# Patient Record
Sex: FEMALE | Race: WHITE | URBAN - METROPOLITAN AREA
[De-identification: names, ages, dates, MRNs, and addresses within clinical notes are randomized per-mention and may not be internally consistent; named-entity substitution may affect disease eponyms.]

---

## 2017-09-12 ENCOUNTER — OUTPATIENT (OUTPATIENT)
Dept: OUTPATIENT SERVICES | Facility: HOSPITAL | Age: 72
LOS: 1 days | Discharge: HOME | End: 2017-09-12

## 2017-09-12 DIAGNOSIS — Z12.31 ENCOUNTER FOR SCREENING MAMMOGRAM FOR MALIGNANT NEOPLASM OF BREAST: ICD-10-CM

## 2018-09-14 ENCOUNTER — OUTPATIENT (OUTPATIENT)
Dept: OUTPATIENT SERVICES | Facility: HOSPITAL | Age: 73
LOS: 1 days | Discharge: HOME | End: 2018-09-14

## 2018-09-14 DIAGNOSIS — Z12.31 ENCOUNTER FOR SCREENING MAMMOGRAM FOR MALIGNANT NEOPLASM OF BREAST: ICD-10-CM

## 2019-10-28 ENCOUNTER — OUTPATIENT (OUTPATIENT)
Dept: OUTPATIENT SERVICES | Facility: HOSPITAL | Age: 74
LOS: 1 days | Discharge: HOME | End: 2019-10-28
Payer: MEDICARE

## 2019-10-28 DIAGNOSIS — Z12.31 ENCOUNTER FOR SCREENING MAMMOGRAM FOR MALIGNANT NEOPLASM OF BREAST: ICD-10-CM

## 2019-10-28 PROCEDURE — 77063 BREAST TOMOSYNTHESIS BI: CPT | Mod: 26

## 2019-10-28 PROCEDURE — 77067 SCR MAMMO BI INCL CAD: CPT | Mod: 26

## 2019-12-10 ENCOUNTER — OUTPATIENT (OUTPATIENT)
Dept: OUTPATIENT SERVICES | Facility: HOSPITAL | Age: 74
LOS: 1 days | Discharge: HOME | End: 2019-12-10
Payer: MEDICARE

## 2019-12-10 DIAGNOSIS — R92.8 OTHER ABNORMAL AND INCONCLUSIVE FINDINGS ON DIAGNOSTIC IMAGING OF BREAST: ICD-10-CM

## 2019-12-10 PROCEDURE — 77065 DX MAMMO INCL CAD UNI: CPT | Mod: 26,RT

## 2019-12-10 PROCEDURE — 76642 ULTRASOUND BREAST LIMITED: CPT | Mod: 26,RT

## 2019-12-10 PROCEDURE — G0279: CPT | Mod: 26,RT

## 2020-03-28 ENCOUNTER — INPATIENT (INPATIENT)
Facility: HOSPITAL | Age: 75
LOS: 1 days | Discharge: HOME HEALTH | End: 2020-03-30
Attending: INTERNAL MEDICINE | Admitting: INTERNAL MEDICINE
Payer: MEDICARE

## 2020-03-28 VITALS
WEIGHT: 139.99 LBS | HEART RATE: 107 BPM | TEMPERATURE: 102 F | SYSTOLIC BLOOD PRESSURE: 144 MMHG | DIASTOLIC BLOOD PRESSURE: 67 MMHG | OXYGEN SATURATION: 93 % | HEIGHT: 61 IN | RESPIRATION RATE: 20 BRPM

## 2020-03-28 LAB
ALBUMIN SERPL ELPH-MCNC: 4.4 G/DL — SIGNIFICANT CHANGE UP (ref 3.5–5.2)
ALP SERPL-CCNC: 77 U/L — SIGNIFICANT CHANGE UP (ref 30–115)
ALT FLD-CCNC: 15 U/L — SIGNIFICANT CHANGE UP (ref 0–41)
ANION GAP SERPL CALC-SCNC: 14 MMOL/L — SIGNIFICANT CHANGE UP (ref 7–14)
APTT BLD: 29.3 SEC — SIGNIFICANT CHANGE UP (ref 27–39.2)
AST SERPL-CCNC: 24 U/L — SIGNIFICANT CHANGE UP (ref 0–41)
BASE EXCESS BLDV CALC-SCNC: 1.8 MMOL/L — SIGNIFICANT CHANGE UP (ref -2–2)
BASOPHILS # BLD AUTO: 0.01 K/UL — SIGNIFICANT CHANGE UP (ref 0–0.2)
BASOPHILS NFR BLD AUTO: 0.2 % — SIGNIFICANT CHANGE UP (ref 0–1)
BILIRUB SERPL-MCNC: 0.5 MG/DL — SIGNIFICANT CHANGE UP (ref 0.2–1.2)
BUN SERPL-MCNC: 23 MG/DL — HIGH (ref 10–20)
CA-I SERPL-SCNC: 1.18 MMOL/L — SIGNIFICANT CHANGE UP (ref 1.12–1.3)
CALCIUM SERPL-MCNC: 9.1 MG/DL — SIGNIFICANT CHANGE UP (ref 8.5–10.1)
CHLORIDE SERPL-SCNC: 101 MMOL/L — SIGNIFICANT CHANGE UP (ref 98–110)
CO2 SERPL-SCNC: 25 MMOL/L — SIGNIFICANT CHANGE UP (ref 17–32)
CREAT SERPL-MCNC: 0.8 MG/DL — SIGNIFICANT CHANGE UP (ref 0.7–1.5)
EOSINOPHIL # BLD AUTO: 0 K/UL — SIGNIFICANT CHANGE UP (ref 0–0.7)
EOSINOPHIL NFR BLD AUTO: 0 % — SIGNIFICANT CHANGE UP (ref 0–8)
GAS PNL BLDV: 142 MMOL/L — SIGNIFICANT CHANGE UP (ref 136–145)
GAS PNL BLDV: SIGNIFICANT CHANGE UP
GLUCOSE SERPL-MCNC: 134 MG/DL — HIGH (ref 70–99)
HCO3 BLDV-SCNC: 27 MMOL/L — SIGNIFICANT CHANGE UP (ref 22–29)
HCT VFR BLD CALC: 43.5 % — SIGNIFICANT CHANGE UP (ref 37–47)
HCT VFR BLDA CALC: 49.3 % — HIGH (ref 34–44)
HGB BLD CALC-MCNC: 16.1 G/DL — SIGNIFICANT CHANGE UP (ref 14–18)
HGB BLD-MCNC: 13.9 G/DL — SIGNIFICANT CHANGE UP (ref 12–16)
HOROWITZ INDEX BLDV+IHG-RTO: 21 — SIGNIFICANT CHANGE UP
IMM GRANULOCYTES NFR BLD AUTO: 0.3 % — SIGNIFICANT CHANGE UP (ref 0.1–0.3)
INR BLD: 1.14 RATIO — SIGNIFICANT CHANGE UP (ref 0.65–1.3)
LACTATE BLDV-MCNC: 1.4 MMOL/L — SIGNIFICANT CHANGE UP (ref 0.5–1.6)
LYMPHOCYTES # BLD AUTO: 1.04 K/UL — LOW (ref 1.2–3.4)
LYMPHOCYTES # BLD AUTO: 16.4 % — LOW (ref 20.5–51.1)
MCHC RBC-ENTMCNC: 24.7 PG — LOW (ref 27–31)
MCHC RBC-ENTMCNC: 32 G/DL — SIGNIFICANT CHANGE UP (ref 32–37)
MCV RBC AUTO: 77.3 FL — LOW (ref 81–99)
MONOCYTES # BLD AUTO: 0.41 K/UL — SIGNIFICANT CHANGE UP (ref 0.1–0.6)
MONOCYTES NFR BLD AUTO: 6.5 % — SIGNIFICANT CHANGE UP (ref 1.7–9.3)
NEUTROPHILS # BLD AUTO: 4.87 K/UL — SIGNIFICANT CHANGE UP (ref 1.4–6.5)
NEUTROPHILS NFR BLD AUTO: 76.6 % — HIGH (ref 42.2–75.2)
NRBC # BLD: 0 /100 WBCS — SIGNIFICANT CHANGE UP (ref 0–0)
NT-PROBNP SERPL-SCNC: 56 PG/ML — SIGNIFICANT CHANGE UP (ref 0–300)
PCO2 BLDV: 45 MMHG — SIGNIFICANT CHANGE UP (ref 41–51)
PH BLDV: 7.4 — SIGNIFICANT CHANGE UP (ref 7.26–7.43)
PLATELET # BLD AUTO: 125 K/UL — LOW (ref 130–400)
PO2 BLDV: 24 MMHG — SIGNIFICANT CHANGE UP (ref 20–40)
POTASSIUM BLDV-SCNC: 3.6 MMOL/L — SIGNIFICANT CHANGE UP (ref 3.3–5.6)
POTASSIUM SERPL-MCNC: 3.8 MMOL/L — SIGNIFICANT CHANGE UP (ref 3.5–5)
POTASSIUM SERPL-SCNC: 3.8 MMOL/L — SIGNIFICANT CHANGE UP (ref 3.5–5)
PROT SERPL-MCNC: 7.3 G/DL — SIGNIFICANT CHANGE UP (ref 6–8)
PROTHROM AB SERPL-ACNC: 13.1 SEC — HIGH (ref 9.95–12.87)
RBC # BLD: 5.63 M/UL — HIGH (ref 4.2–5.4)
RBC # FLD: 13.9 % — SIGNIFICANT CHANGE UP (ref 11.5–14.5)
SAO2 % BLDV: 40 % — SIGNIFICANT CHANGE UP
SODIUM SERPL-SCNC: 140 MMOL/L — SIGNIFICANT CHANGE UP (ref 135–146)
TROPONIN T SERPL-MCNC: <0.01 NG/ML — SIGNIFICANT CHANGE UP
WBC # BLD: 6.35 K/UL — SIGNIFICANT CHANGE UP (ref 4.8–10.8)
WBC # FLD AUTO: 6.35 K/UL — SIGNIFICANT CHANGE UP (ref 4.8–10.8)

## 2020-03-28 PROCEDURE — 99497 ADVNCD CARE PLAN 30 MIN: CPT | Mod: 25

## 2020-03-28 PROCEDURE — 99285 EMERGENCY DEPT VISIT HI MDM: CPT

## 2020-03-28 PROCEDURE — 71045 X-RAY EXAM CHEST 1 VIEW: CPT | Mod: 26

## 2020-03-28 PROCEDURE — 99223 1ST HOSP IP/OBS HIGH 75: CPT

## 2020-03-28 RX ORDER — METFORMIN HYDROCHLORIDE 850 MG/1
1 TABLET ORAL
Qty: 0 | Refills: 0 | DISCHARGE

## 2020-03-28 RX ORDER — ALBUTEROL 90 UG/1
2 AEROSOL, METERED ORAL EVERY 6 HOURS
Refills: 0 | Status: DISCONTINUED | OUTPATIENT
Start: 2020-03-28 | End: 2020-03-30

## 2020-03-28 RX ORDER — AZITHROMYCIN 500 MG/1
500 TABLET, FILM COATED ORAL ONCE
Refills: 0 | Status: COMPLETED | OUTPATIENT
Start: 2020-03-28 | End: 2020-03-28

## 2020-03-28 RX ORDER — CHLORHEXIDINE GLUCONATE 213 G/1000ML
1 SOLUTION TOPICAL
Refills: 0 | Status: DISCONTINUED | OUTPATIENT
Start: 2020-03-28 | End: 2020-03-30

## 2020-03-28 RX ORDER — AMLODIPINE BESYLATE 2.5 MG/1
1 TABLET ORAL
Qty: 0 | Refills: 0 | DISCHARGE

## 2020-03-28 RX ORDER — BUDESONIDE AND FORMOTEROL FUMARATE DIHYDRATE 160; 4.5 UG/1; UG/1
2 AEROSOL RESPIRATORY (INHALATION)
Refills: 0 | Status: DISCONTINUED | OUTPATIENT
Start: 2020-03-28 | End: 2020-03-30

## 2020-03-28 RX ORDER — AMLODIPINE BESYLATE 2.5 MG/1
5 TABLET ORAL DAILY
Refills: 0 | Status: DISCONTINUED | OUTPATIENT
Start: 2020-03-28 | End: 2020-03-30

## 2020-03-28 RX ORDER — ACETAMINOPHEN 500 MG
650 TABLET ORAL EVERY 6 HOURS
Refills: 0 | Status: DISCONTINUED | OUTPATIENT
Start: 2020-03-28 | End: 2020-03-30

## 2020-03-28 RX ORDER — ATORVASTATIN CALCIUM 80 MG/1
10 TABLET, FILM COATED ORAL AT BEDTIME
Refills: 0 | Status: DISCONTINUED | OUTPATIENT
Start: 2020-03-28 | End: 2020-03-30

## 2020-03-28 RX ORDER — PIOGLITAZONE HYDROCHLORIDE 15 MG/1
1 TABLET ORAL
Qty: 0 | Refills: 0 | DISCHARGE

## 2020-03-28 RX ORDER — ACETAMINOPHEN 500 MG
975 TABLET ORAL ONCE
Refills: 0 | Status: COMPLETED | OUTPATIENT
Start: 2020-03-28 | End: 2020-03-28

## 2020-03-28 RX ORDER — SODIUM CHLORIDE 9 MG/ML
1000 INJECTION, SOLUTION INTRAVENOUS ONCE
Refills: 0 | Status: COMPLETED | OUTPATIENT
Start: 2020-03-28 | End: 2020-03-28

## 2020-03-28 RX ADMIN — Medication 975 MILLIGRAM(S): at 20:47

## 2020-03-28 RX ADMIN — AZITHROMYCIN 255 MILLIGRAM(S): 500 TABLET, FILM COATED ORAL at 20:49

## 2020-03-28 RX ADMIN — SODIUM CHLORIDE 1000 MILLILITER(S): 9 INJECTION, SOLUTION INTRAVENOUS at 20:48

## 2020-03-28 NOTE — ED PROVIDER NOTE - NS ED ROS FT
Review of Systems         Constitutional: See HPI       EENT:  See HPI       Cardiovascular: (-) chest pain (-) syncope       Respiratory: See HPI       Gastrointestinal: (-) abdominal pain (-) vomiting (-) diarrhea (-) nausea (-) constipation       Genitourinary: (-) dysuria       Musculoskeletal: (-) neck pain (-) back pain (-) joint pain       Integumentary: (-) rash       Neurological: (-) headache (-) altered mental status (-) dizziness       Psych: (-) psych history

## 2020-03-28 NOTE — H&P ADULT - NSHPLABSRESULTS_GEN_ALL_CORE
13.9   6.35  )-----------( 125      ( 28 Mar 2020 21:08 )             43.5       03-28    140  |  101  |  23<H>  ----------------------------<  134<H>  3.8   |  25  |  0.8    Ca    9.1      28 Mar 2020 21:08    TPro  7.3  /  Alb  4.4  /  TBili  0.5  /  DBili  x   /  AST  24  /  ALT  15  /  AlkPhos  77  03-28          PT/INR - ( 28 Mar 2020 21:08 )   PT: 13.10 sec;   INR: 1.14 ratio         PTT - ( 28 Mar 2020 21:08 )  PTT:29.3 sec      CARDIAC MARKERS ( 28 Mar 2020 21:08 )  x     / <0.01 ng/mL / x     / x     / x

## 2020-03-28 NOTE — ED PROVIDER NOTE - OBJECTIVE STATEMENT
74 year old female hx COPD, DM, HTN, HLD presenting with flu-like sxs. Patient states she has had fevers, chills, myalgias, dry cough, chest congestion, headache. Her brother passed away 2 days ago from covid and her sister has it; pastora has been in contact with them 2 days before sxs began. Yesterday patient began experiencign shortness of breath at rest, worse with walking, moderate, no pall/prov factors. Patient hasn't been on any abx and is nto on any medications for her copd. No neck pain/rashes. 74 year old female hx COPD, DM, HTN, HLD presenting with flu-like sxs x 6 days. Patient states she has had fevers, chills, myalgias, dry cough, chest congestion, headache. Her brother passed away 2 days ago from covid and her sister has it; patient has been in contact with them 2 days before sxs began. Yesterday patient began experiencing shortness of breath at rest, worse with walking, moderate, no pall/prov factors. Patient hasn't been on any abx and is not on any medications for her copd. No neck pain/rashes/chest pain/abd pain/n/v/d.

## 2020-03-28 NOTE — H&P ADULT - HISTORY OF PRESENT ILLNESS
73 yo F with PMHx of COPD not on home oxygen, active smoker, DM II, HTN, HLD presented with complaint of fevers, chills, myalgias, productive cough of whitish sputum and chest tightness for several days duration. Patient's brother recently  from COVID-19 complications and her sister was also tested positive recently discharged from the hospital. Patient denies nausea, vomiting, chest pain, abdominal pain.

## 2020-03-28 NOTE — H&P ADULT - ASSESSMENT
75 yo F presented with fever, sob, cough.    #Suspected Viral URI, doubt concurrent COPD exacerbation: COVID-19 sent, ID and hydroxychloroquine if positive, incentive spirometry, pulmonary toilet, anti-pyretic, started on Symbicort, proair PRN, f/u CRP, procalcitonin, AM EKG, patient was saturating 93% on room air, 96% on 2L nasal cannula, desaturated to 86% while ambulating in ED.     #DM (monitor fingersticks start basal bolus insulin if greater than 180)/HLD/HTN: Continue home medications     Disposition: Home when medically stable. 75 yo F presented with fever, sob, cough.    #Suspected Viral URI, doubt concurrent COPD exacerbation: COVID-19 sent, ID and hydroxychloroquine if positive, incentive spirometry, pulmonary toilet, anti-pyretic, started on Symbicort, proair PRN, f/u CRP, procalcitonin, AM EKG, patient was saturating 93% on room air, 96% on 2L nasal cannula, desaturated to 86% while ambulating in ED.     #DM (monitor fingersticks start basal bolus insulin if greater than 180)/HLD/HTN: Continue home medications     #Tobacco cessation counseled    Disposition: Home when medically stable.

## 2020-03-28 NOTE — ED PROVIDER NOTE - ATTENDING CONTRIBUTION TO CARE
I personally evaluated the patient. I reviewed the Resident’s or Physician Assistant’s note (as assigned above), and agree with the findings and plan except as documented in my note.  Chart reviewed. H/O COPD, HTN, DM, presents with fever, cough and SOB. States brother passed away from Corona infection. Exam shows alert patient in no distress, HEENT NCAT, lungs with rales right base, tachycardic, abdomen soft NT +BS, no CCE.

## 2020-03-28 NOTE — H&P ADULT - NSHPPHYSICALEXAM_GEN_ALL_CORE
PHYSICAL EXAM:    CONSTITUTIONAL: NAD  ENMT: EOMI, PERRLA, No tonsillar erythema, exudates, or enlargement, neck supple, No JVD  PSYCH: Alert & Oriented X3  RESPIRATORY: Decreased bilateral air entry, negative rales, minimal basilar rales   CARDIOVASCULAR: Regular rate and rhythm; No murmurs, rubs, or gallops, negative edema  GASTROINTESTINAL: Soft, Nontender, Nondistended; Bowel sounds present  EXTREMITIES:  2+ Peripheral Pulses, No clubbing, cyanosis  SKIN: No rashes or lesions

## 2020-03-28 NOTE — ED PROVIDER NOTE - CLINICAL SUMMARY MEDICAL DECISION MAKING FREE TEXT BOX
Labs unremarkable. Covid swab pending. CXR bilateral infiltrates. EKG no acute changes. Patient desaturates on mild exertion. Given IVF and azirthromycin. Will admit.

## 2020-03-28 NOTE — ED PROVIDER NOTE - PROGRESS NOTE DETAILS
Patient 96% on 3L NC.   90% on RA sitting in stretcher  drops to 86% with ambulation.  had a discussion with patient with risks and benefits of going home vs staying in hospital.

## 2020-03-28 NOTE — GOALS OF CARE CONVERSATION - ADVANCED CARE PLANNING - CONVERSATION DETAILS
Goals of care discussed in light of patient's presenting symptoms and comorbidities, patient wishes to be full code, wishes for trial of intubation/mechanical ventilation if respiratory status deteriorates.

## 2020-03-28 NOTE — ED PROVIDER NOTE - CARE PLAN
Principal Discharge DX:	Hypoxia  Secondary Diagnosis:	Viral illness  Secondary Diagnosis:	Dyspnea  Secondary Diagnosis:	Fever  Secondary Diagnosis:	Cough

## 2020-03-28 NOTE — ED PROVIDER NOTE - PHYSICAL EXAMINATION
Physical Exam    Vital Signs: I have reviewed the initial vital signs  Constitutional: well-nourished, appears stated age, no acute distress  EENT: Conjunctiva pink, Sclera clear, PERRLA, EOMI. Mucous membranes dry, no exudates or lesions noted, uvula midline.  Cardiovascular: S1 and S2 present, regular rate, regular rhythm. Well perfused extremities, no peripheral edema  Respiratory: right basilar crackles, 93% on room air, unlabored respiratory effort, otherwise clear to auscultation bilaterally no wheezing, rales or rhonchi  Gastrointestinal: soft, non-tender abdomen. No guarding or rebound tenderness  Musculoskeletal: supple nontender neck, no midline tenderness, no joint pain  Integumentary: warm, dry, no rash  Psychiatric: appropriate mood, appropriate affect

## 2020-03-29 LAB
ANION GAP SERPL CALC-SCNC: 12 MMOL/L — SIGNIFICANT CHANGE UP (ref 7–14)
BASOPHILS # BLD AUTO: 0.02 K/UL — SIGNIFICANT CHANGE UP (ref 0–0.2)
BASOPHILS NFR BLD AUTO: 0.3 % — SIGNIFICANT CHANGE UP (ref 0–1)
BUN SERPL-MCNC: 17 MG/DL — SIGNIFICANT CHANGE UP (ref 10–20)
CALCIUM SERPL-MCNC: 8.4 MG/DL — LOW (ref 8.5–10.1)
CHLORIDE SERPL-SCNC: 105 MMOL/L — SIGNIFICANT CHANGE UP (ref 98–110)
CO2 SERPL-SCNC: 25 MMOL/L — SIGNIFICANT CHANGE UP (ref 17–32)
CREAT SERPL-MCNC: 0.5 MG/DL — LOW (ref 0.7–1.5)
CRP SERPL-MCNC: 4.11 MG/DL — HIGH (ref 0–0.4)
EOSINOPHIL # BLD AUTO: 0 K/UL — SIGNIFICANT CHANGE UP (ref 0–0.7)
EOSINOPHIL NFR BLD AUTO: 0 % — SIGNIFICANT CHANGE UP (ref 0–8)
GLUCOSE BLDC GLUCOMTR-MCNC: 110 MG/DL — HIGH (ref 70–99)
GLUCOSE BLDC GLUCOMTR-MCNC: 114 MG/DL — HIGH (ref 70–99)
GLUCOSE SERPL-MCNC: 120 MG/DL — HIGH (ref 70–99)
HCT VFR BLD CALC: 37.8 % — SIGNIFICANT CHANGE UP (ref 37–47)
HGB BLD-MCNC: 12.1 G/DL — SIGNIFICANT CHANGE UP (ref 12–16)
IMM GRANULOCYTES NFR BLD AUTO: 0.3 % — SIGNIFICANT CHANGE UP (ref 0.1–0.3)
LYMPHOCYTES # BLD AUTO: 1.13 K/UL — LOW (ref 1.2–3.4)
LYMPHOCYTES # BLD AUTO: 17.7 % — LOW (ref 20.5–51.1)
MAGNESIUM SERPL-MCNC: 1.8 MG/DL — SIGNIFICANT CHANGE UP (ref 1.8–2.4)
MCHC RBC-ENTMCNC: 24.7 PG — LOW (ref 27–31)
MCHC RBC-ENTMCNC: 32 G/DL — SIGNIFICANT CHANGE UP (ref 32–37)
MCV RBC AUTO: 77.3 FL — LOW (ref 81–99)
MONOCYTES # BLD AUTO: 0.42 K/UL — SIGNIFICANT CHANGE UP (ref 0.1–0.6)
MONOCYTES NFR BLD AUTO: 6.6 % — SIGNIFICANT CHANGE UP (ref 1.7–9.3)
NEUTROPHILS # BLD AUTO: 4.79 K/UL — SIGNIFICANT CHANGE UP (ref 1.4–6.5)
NEUTROPHILS NFR BLD AUTO: 75.1 % — SIGNIFICANT CHANGE UP (ref 42.2–75.2)
NRBC # BLD: 0 /100 WBCS — SIGNIFICANT CHANGE UP (ref 0–0)
PHOSPHATE SERPL-MCNC: 3.2 MG/DL — SIGNIFICANT CHANGE UP (ref 2.1–4.9)
PLATELET # BLD AUTO: 121 K/UL — LOW (ref 130–400)
POTASSIUM SERPL-MCNC: 3.5 MMOL/L — SIGNIFICANT CHANGE UP (ref 3.5–5)
POTASSIUM SERPL-SCNC: 3.5 MMOL/L — SIGNIFICANT CHANGE UP (ref 3.5–5)
PROCALCITONIN SERPL-MCNC: 0.08 NG/ML — SIGNIFICANT CHANGE UP (ref 0.02–0.1)
RBC # BLD: 4.89 M/UL — SIGNIFICANT CHANGE UP (ref 4.2–5.4)
RBC # FLD: 13.9 % — SIGNIFICANT CHANGE UP (ref 11.5–14.5)
SARS-COV-2 RNA SPEC QL NAA+PROBE: DETECTED
SODIUM SERPL-SCNC: 142 MMOL/L — SIGNIFICANT CHANGE UP (ref 135–146)
WBC # BLD: 6.38 K/UL — SIGNIFICANT CHANGE UP (ref 4.8–10.8)
WBC # FLD AUTO: 6.38 K/UL — SIGNIFICANT CHANGE UP (ref 4.8–10.8)

## 2020-03-29 PROCEDURE — 99233 SBSQ HOSP IP/OBS HIGH 50: CPT

## 2020-03-29 RX ADMIN — Medication 650 MILLIGRAM(S): at 07:46

## 2020-03-29 RX ADMIN — BUDESONIDE AND FORMOTEROL FUMARATE DIHYDRATE 2 PUFF(S): 160; 4.5 AEROSOL RESPIRATORY (INHALATION) at 07:46

## 2020-03-29 RX ADMIN — Medication 650 MILLIGRAM(S): at 22:40

## 2020-03-29 RX ADMIN — Medication 975 MILLIGRAM(S): at 04:22

## 2020-03-29 RX ADMIN — Medication 650 MILLIGRAM(S): at 21:48

## 2020-03-29 RX ADMIN — AMLODIPINE BESYLATE 5 MILLIGRAM(S): 2.5 TABLET ORAL at 06:30

## 2020-03-29 RX ADMIN — ATORVASTATIN CALCIUM 10 MILLIGRAM(S): 80 TABLET, FILM COATED ORAL at 21:47

## 2020-03-29 NOTE — PATIENT PROFILE ADULT - NSPROMEDSPATCH_GEN_A_NUR
none
hx of chronic systolic heart failure ,now with cough  1. CT chest r/o CHF vs HCAP  PNA  2. CAYLA panel  3. continue coreg,lasix for now

## 2020-03-29 NOTE — PATIENT PROFILE ADULT - NSPROEDALEARNPREF_GEN_A_NUR
verbal instruction/individual instruction individual instruction/written material/verbal instruction

## 2020-03-29 NOTE — PROGRESS NOTE ADULT - SUBJECTIVE AND OBJECTIVE BOX
NIXON VILLAGOMEZ  74y, Female  Allergy: No Known Allergies    Hospital Day: 1d    Patient seen and examined earlier today. No acute events overnight.    PMH/PSH:  PAST MEDICAL & SURGICAL HISTORY:  High cholesterol  HTN (hypertension)  DM (diabetes mellitus)  No significant past surgical history    VITALS:  T(F): 99.1 (03-29-20 @ 15:50), Max: 101.8 (03-28-20 @ 19:59)  HR: 99 (03-29-20 @ 15:50)  BP: 129/72 (03-29-20 @ 15:50) (119/81 - 144/67)  RR: 20 (03-29-20 @ 11:13)  SpO2: 99% (03-29-20 @ 15:50)    TESTS & MEASUREMENTS:  Weight (Kg): 63.5 (03-28-20 @ 19:59)  BMI (kg/m2): 26.5 (03-28)                        12.1   6.38  )-----------( 121      ( 29 Mar 2020 04:30 )             37.8     PT/INR - ( 28 Mar 2020 21:08 )   PT: 13.10 sec;   INR: 1.14 ratio         PTT - ( 28 Mar 2020 21:08 )  PTT:29.3 sec  03-29    142  |  105  |  17  ----------------------------<  120<H>  3.5   |  25  |  0.5<L>    Ca    8.4<L>      29 Mar 2020 04:30  Phos  3.2     03-29  Mg     1.8     03-29    TPro  7.3  /  Alb  4.4  /  TBili  0.5  /  DBili  x   /  AST  24  /  ALT  15  /  AlkPhos  77  03-28    LIVER FUNCTIONS - ( 28 Mar 2020 21:08 )  Alb: 4.4 g/dL / Pro: 7.3 g/dL / ALK PHOS: 77 U/L / ALT: 15 U/L / AST: 24 U/L / GGT: x           CARDIAC MARKERS ( 28 Mar 2020 21:08 )  x     / <0.01 ng/mL / x     / x     / x        RECENT DIAGNOSTIC ORDERS:  Urinalysis: STAT (03-28-20 @ 20:17)  Culture - Blood: Routine  Specimen Source: Blood-Peripheral  Addl Info: Peripheral Site 1 (03-28-20 @ 20:17)  Culture - Blood: Routine  Specimen Source: Blood-Peripheral  Addl Info: Peripheral Site 2 (03-28-20 @ 20:17)  Culture - Urine: Routine  Specimen Source: Clean Catch (Midstream) (03-28-20 @ 20:17)  Procalcitonin, Serum: AM Sched. Collection: 29-Mar-2020 04:30 (03-28-20 @ 22:41)  C-Reactive Protein, Serum: AM Sched. Collection: 29-Mar-2020 04:30 (03-28-20 @ 22:41)  Diet, DASH/TLC:   Sodium & Cholesterol Restricted  Consistent Carbohydrate No Snacks (03-28-20 @ 22:41)  Hepatitis C Antibody Screen: AM Sched. Collection: 29-Mar-2020 04:30 (03-28-20 @ 22:51)    MEDICATIONS:  MEDICATIONS  (STANDING):  amLODIPine   Tablet 5 milliGRAM(s) Oral daily  atorvastatin 10 milliGRAM(s) Oral at bedtime  budesonide 160 MICROgram(s)/formoterol 4.5 MICROgram(s) Inhaler 2 Puff(s) Inhalation two times a day  chlorhexidine 4% Liquid 1 Application(s) Topical <User Schedule>    MEDICATIONS  (PRN):  acetaminophen   Tablet .. 650 milliGRAM(s) Oral every 6 hours PRN Temp greater or equal to 38C (100.4F)  ALBUTerol    90 MICROgram(s) HFA Inhaler 2 Puff(s) Inhalation every 6 hours PRN Shortness of Breath and/or Wheezing    HOME MEDICATIONS:  amLODIPine 5 mg oral tablet (03-28)  metFORMIN 500 mg oral tablet, extended release (03-28)  pioglitazone 15 mg oral tablet (03-28)  pravastatin 20 mg oral tablet (03-28)    REVIEW OF SYSTEMS:  All other review of systems is negative unless indicated above.     PHYSICAL EXAM:  GENERAL: NAD  HEENT: No Swelling  CHEST/LUNG: Good air entry, No wheezing  HEART: RRR, No murmurs  ABDOMEN: Soft, Bowel sounds present  EXTREMITIES:  No clubbing

## 2020-03-30 ENCOUNTER — TRANSCRIPTION ENCOUNTER (OUTPATIENT)
Age: 75
End: 2020-03-30

## 2020-03-30 VITALS — OXYGEN SATURATION: 96 % | DIASTOLIC BLOOD PRESSURE: 63 MMHG | SYSTOLIC BLOOD PRESSURE: 125 MMHG | HEART RATE: 86 BPM

## 2020-03-30 LAB
-  COAGULASE NEGATIVE STAPHYLOCOCCUS: SIGNIFICANT CHANGE UP
GLUCOSE BLDC GLUCOMTR-MCNC: 103 MG/DL — HIGH (ref 70–99)
GLUCOSE BLDC GLUCOMTR-MCNC: 104 MG/DL — HIGH (ref 70–99)
GLUCOSE BLDC GLUCOMTR-MCNC: 119 MG/DL — HIGH (ref 70–99)
GLUCOSE BLDC GLUCOMTR-MCNC: 124 MG/DL — HIGH (ref 70–99)
GRAM STN FLD: SIGNIFICANT CHANGE UP
HCV AB S/CO SERPL IA: 0.04 COI — SIGNIFICANT CHANGE UP
HCV AB SERPL-IMP: SIGNIFICANT CHANGE UP
METHOD TYPE: SIGNIFICANT CHANGE UP
SPECIMEN SOURCE: SIGNIFICANT CHANGE UP
SPECIMEN SOURCE: SIGNIFICANT CHANGE UP

## 2020-03-30 PROCEDURE — 99238 HOSP IP/OBS DSCHRG MGMT 30/<: CPT

## 2020-03-30 RX ORDER — HYDROXYCHLOROQUINE SULFATE 200 MG
2 TABLET ORAL
Qty: 12 | Refills: 0
Start: 2020-03-30 | End: 2020-04-03

## 2020-03-30 RX ORDER — MORPHINE SULFATE 50 MG/1
4 CAPSULE, EXTENDED RELEASE ORAL ONCE
Refills: 0 | Status: DISCONTINUED | OUTPATIENT
Start: 2020-03-30 | End: 2020-03-30

## 2020-03-30 RX ORDER — MORPHINE SULFATE 50 MG/1
1 CAPSULE, EXTENDED RELEASE ORAL
Qty: 100 | Refills: 0 | Status: DISCONTINUED | OUTPATIENT
Start: 2020-03-30 | End: 2020-03-30

## 2020-03-30 RX ORDER — MORPHINE SULFATE 50 MG/1
2 CAPSULE, EXTENDED RELEASE ORAL
Refills: 0 | Status: DISCONTINUED | OUTPATIENT
Start: 2020-03-30 | End: 2020-03-30

## 2020-03-30 RX ORDER — ROBINUL 0.2 MG/ML
0.1 INJECTION INTRAMUSCULAR; INTRAVENOUS THREE TIMES A DAY
Refills: 0 | Status: DISCONTINUED | OUTPATIENT
Start: 2020-03-30 | End: 2020-03-30

## 2020-03-30 RX ORDER — CEFAZOLIN SODIUM 1 G
1000 VIAL (EA) INJECTION EVERY 8 HOURS
Refills: 0 | Status: DISCONTINUED | OUTPATIENT
Start: 2020-03-30 | End: 2020-03-30

## 2020-03-30 RX ADMIN — CHLORHEXIDINE GLUCONATE 1 APPLICATION(S): 213 SOLUTION TOPICAL at 08:24

## 2020-03-30 RX ADMIN — Medication 100 MILLIGRAM(S): at 13:05

## 2020-03-30 RX ADMIN — AMLODIPINE BESYLATE 5 MILLIGRAM(S): 2.5 TABLET ORAL at 05:30

## 2020-03-30 RX ADMIN — BUDESONIDE AND FORMOTEROL FUMARATE DIHYDRATE 2 PUFF(S): 160; 4.5 AEROSOL RESPIRATORY (INHALATION) at 10:17

## 2020-03-30 RX ADMIN — Medication 650 MILLIGRAM(S): at 05:31

## 2020-03-30 RX ADMIN — Medication 100 MILLIGRAM(S): at 06:37

## 2020-03-30 NOTE — DISCHARGE NOTE PROVIDER - NSDCCPCAREPLAN_GEN_ALL_CORE_FT
PRINCIPAL DISCHARGE DIAGNOSIS  Diagnosis: Coronavirus infection  Assessment and Plan of Treatment: Supportive care at home. Complete hydroxychloroquine course. Complete 7 days doxycycline 100mg twice daily. Ensure adequate hydration and use oxygen for dyspnea. Return to ED for worsening dyspnea.      SECONDARY DISCHARGE DIAGNOSES  Diagnosis: Cough  Assessment and Plan of Treatment:     Diagnosis: Fever  Assessment and Plan of Treatment:     Diagnosis: Dyspnea  Assessment and Plan of Treatment:     Diagnosis: Viral illness  Assessment and Plan of Treatment:

## 2020-03-30 NOTE — CHART NOTE - NSCHARTNOTEFT_GEN_A_CORE
Despite IV steroids and bronchodilators patient desaturates.      - Room air pulse ox. at rest:   87   %      - Pulse ox while ambulating      94    %   on 6 liters n/c  O2     Patient will require home O2 for discharge.  Patient is aware and agreeable to home O2.  Patient is in a chronic stable state of COPD     Patient treated for pneumonia: No Despite IV steroids and bronchodilators patient desaturates.      - Room air pulse ox. at rest:   87   %      - Pulse ox while ambulating      94    %   on 3 liters n/c  O2     Patient will require home O2 for discharge.  Patient is aware and agreeable to home O2.  Patient is in a chronic stable state of COPD     Patient treated for pneumonia: No

## 2020-03-30 NOTE — DISCHARGE NOTE PROVIDER - HOSPITAL COURSE
73 yo F with PMHx of COPD not on home oxygen, active smoker, DM II, HTN, HLD presented with complaint of fevers, chills, myalgias, productive cough of whitish sputum and chest tightness for several days duration. Patient's brother recently  from COVID-19 complications and her sister was also tested positive recently discharged from the hospital. Patient denies nausea, vomiting, chest pain, abdominal pain.          Patient 73 yo F with PMHx of COPD not on home oxygen, active smoker, DM II, HTN, HLD presented with complaint of fevers, chills, myalgias, productive cough of whitish sputum and chest tightness for several days duration. Patient's brother recently  from COVID-19 complications and her sister was also tested positive recently discharged from the hospital. Patient denies nausea, vomiting, chest pain, abdominal pain.          Patient clinically improved requiring low oxygen supplementation. Patient will be discharged with home oxygen.

## 2020-03-30 NOTE — DISCHARGE NOTE NURSING/CASE MANAGEMENT/SOCIAL WORK - PATIENT PORTAL LINK FT
You can access the FollowMyHealth Patient Portal offered by [facility] by registering at the following website: [email or web address]. By joining [facility]’s FollowMyHealth portal, you will also be able to view your health information using other applications (apps) compatible with our system.

## 2020-03-30 NOTE — DISCHARGE NOTE NURSING/CASE MANAGEMENT/SOCIAL WORK - NSDCPEHOTLINE_GEN_ALL_CORE
[facility] Smokers Quitline [phone] ([phone])

## 2020-03-30 NOTE — DISCHARGE NOTE NURSING/CASE MANAGEMENT/SOCIAL WORK - NSDCPEEMAIL_GEN_ALL_CORE
[facility] for Tobacco Control email [email or web address]

## 2020-03-30 NOTE — DISCHARGE NOTE PROVIDER - NSDCMRMEDTOKEN_GEN_ALL_CORE_FT
amLODIPine 5 mg oral tablet: 1 tab(s) orally once a day  metFORMIN 500 mg oral tablet, extended release: 1 tab(s) orally once a day  pioglitazone 15 mg oral tablet: 1 tab(s) orally once a day  pravastatin 20 mg oral tablet: 1 tab(s) orally once a day amLODIPine 5 mg oral tablet: 1 tab(s) orally once a day  doxycycline hyclate 100 mg oral capsule: 1 cap(s) orally 2 times a day   hydroxychloroquine 200 mg oral tablet: 2 tabs orally twice a day for 1 day, then 1 tab twice a day for 4 more days. COVID positive.  metFORMIN 500 mg oral tablet, extended release: 1 tab(s) orally once a day  pioglitazone 15 mg oral tablet: 1 tab(s) orally once a day  pravastatin 20 mg oral tablet: 1 tab(s) orally once a day

## 2020-03-30 NOTE — PROGRESS NOTE ADULT - SUBJECTIVE AND OBJECTIVE BOX
NIXON VILLAGOMEZ  74y, Female  Allergy: No Known Allergies    Hospital Day: 2d    Patient seen and examined earlier today. No acute events overnight.    PMH/PSH:  PAST MEDICAL & SURGICAL HISTORY:  High cholesterol  HTN (hypertension)  DM (diabetes mellitus)  No significant past surgical history    VITALS:  T(F): 98.4 (03-30-20 @ 08:39), Max: 101 (03-30-20 @ 05:30)  HR: 85 (03-30-20 @ 05:30)  BP: 131/63 (03-30-20 @ 05:30) (121/59 - 131/63)  RR: 18 (03-30-20 @ 05:30)  SpO2: 95% (03-30-20 @ 08:39)    TESTS & MEASUREMENTS:  Weight (Kg): 54.5 (03-29-20 @ 16:40)  BMI (kg/m2): 22.7 (03-29)                       12.1   6.38  )-----------( 121      ( 29 Mar 2020 04:30 )             37.8     PT/INR - ( 28 Mar 2020 21:08 )   PT: 13.10 sec;   INR: 1.14 ratio         PTT - ( 28 Mar 2020 21:08 )  PTT:29.3 sec  03-29    142  |  105  |  17  ----------------------------<  120<H>  3.5   |  25  |  0.5<L>    Ca    8.4<L>      29 Mar 2020 04:30  Phos  3.2     03-29  Mg     1.8     03-29    TPro  7.3  /  Alb  4.4  /  TBili  0.5  /  DBili  x   /  AST  24  /  ALT  15  /  AlkPhos  77  03-28    LIVER FUNCTIONS - ( 28 Mar 2020 21:08 )  Alb: 4.4 g/dL / Pro: 7.3 g/dL / ALK PHOS: 77 U/L / ALT: 15 U/L / AST: 24 U/L / GGT: x           CARDIAC MARKERS ( 28 Mar 2020 21:08 )  x     / <0.01 ng/mL / x     / x     / x        Culture - Blood (collected 03-28-20 @ 21:08)  Source: .Blood Blood-Peripheral  Gram Stain (03-30-20 @ 00:03):    Growth in aerobic bottle: Gram Positive Cocci in Clusters  Preliminary Report (03-30-20 @ 00:03):    Growth in aerobic bottle: Gram Positive Cocci in Clusters    ***Blood Panel PCR results on this specimen are available    approximately 3 hours after the Gram stain result.***    Gram stain, PCR, and/or culture results may not always    correspond due to difference in methodologies.    ************************************************************    This PCR assay was performed using [employer].    The following targets are tested for: Enterococcus,    vancomycin resistant enterococci, Listeria monocytogenes,    coagulase negative staphylococci, S. aureus,    methicillin resistant S. aureus, Streptococcus agalactiae    (Group B), S. pneumoniae, S. pyogenes (Group A),    Acinetobacter baumannii, Enterobacter cloacae, E. coli,    Klebsiella oxytoca, K. pneumoniae, Proteus sp.,    Serratia marcescens, Haemophilus influenzae,    Neisseria meningitidis, Pseudomonas aeruginosa, Candida    albicans, C. glabrata, C krusei, C parapsilosis,    C. tropicalis and the KPC resistance gene.  Organism: Blood Culture PCR (03-30-20 @ 02:19)  Organism: Blood Culture PCR (03-30-20 @ 02:19)      -  Coagulase negative Staphylococcus: Detec      Method Type: PCR    Culture - Blood (collected 03-28-20 @ 21:08)  Source: .Blood Blood-Peripheral  Gram Stain (03-30-20 @ 01:15):    Growth in anaerobic bottle: Gram Positive Cocci in Clusters    Growth in aerobic bottle:    Gram Positive Cocci in Clusters  Preliminary Report (03-30-20 @ 01:15):    Growth in anaerobic bottle: Gram Positive Cocci in Clusters    Growth in aerobic bottle:    Gram Positive Cocci in Clusters    MEDICATIONS:  MEDICATIONS  (STANDING):  amLODIPine   Tablet 5 milliGRAM(s) Oral daily  atorvastatin 10 milliGRAM(s) Oral at bedtime  budesonide 160 MICROgram(s)/formoterol 4.5 MICROgram(s) Inhaler 2 Puff(s) Inhalation two times a day  ceFAZolin   IVPB 1000 milliGRAM(s) IV Intermittent every 8 hours  chlorhexidine 4% Liquid 1 Application(s) Topical <User Schedule>  morphine  Infusion 1 mG/Hr (1 mL/Hr) IV Continuous <Continuous>    MEDICATIONS  (PRN):  acetaminophen   Tablet .. 650 milliGRAM(s) Oral every 6 hours PRN Temp greater or equal to 38C (100.4F)  ALBUTerol    90 MICROgram(s) HFA Inhaler 2 Puff(s) Inhalation every 6 hours PRN Shortness of Breath and/or Wheezing  glycopyrrolate Injectable 0.1 milliGRAM(s) IV Push three times a day PRN Secretions  LORazepam   Injectable 1 milliGRAM(s) IV Push every 1 hour PRN Anxiety  morphine  - Injectable 2 milliGRAM(s) IV Push every 15 minutes PRN Dyspnea  morphine  - Injectable 4 milliGRAM(s) IV Push once PRN Prior to extubation    HOME MEDICATIONS:  amLODIPine 5 mg oral tablet (03-28)  metFORMIN 500 mg oral tablet, extended release (03-28)  pioglitazone 15 mg oral tablet (03-28)  pravastatin 20 mg oral tablet (03-28)    REVIEW OF SYSTEMS:  All other review of systems is negative unless indicated above.     PHYSICAL EXAM:  GENERAL: NAD  HEENT: No Swelling  CHEST/LUNG: Good air entry, No wheezing  HEART: RRR, No murmurs  ABDOMEN: Soft, Bowel sounds present  EXTREMITIES:  No clubbing

## 2020-03-30 NOTE — PROGRESS NOTE ADULT - ASSESSMENT
75 yo F presented with fever, sob, cough.    1. COVID PNA  - Will DC on home O2, 87% RA, 94-95% on 3L  - Complete HCQ protocol    2. DM: FS controlled    3. HTN/HLD  - Cont amlodipine 5mg daily  - Cont atorvastatin 10mg qHs    4. COPD  - Likely not in acute exacerbation  - Cont symbicort    GI/DVT PPX    #Progress Note Handoff  Pending (specify): DC planning  Family discussion: d/w pt regarding discharge on oxygen  Disposition: Home

## 2020-03-30 NOTE — DISCHARGE NOTE NURSING/CASE MANAGEMENT/SOCIAL WORK - NSDCPEWEB_GEN_ALL_CORE
No [facility] for Tobacco Control website --- [email or web address] website --- [email or web address]

## 2020-03-31 LAB
-  AMPICILLIN/SULBACTAM: SIGNIFICANT CHANGE UP
-  CEFAZOLIN: SIGNIFICANT CHANGE UP
-  CLINDAMYCIN: SIGNIFICANT CHANGE UP
-  ERYTHROMYCIN: SIGNIFICANT CHANGE UP
-  GENTAMICIN: SIGNIFICANT CHANGE UP
-  OXACILLIN: SIGNIFICANT CHANGE UP
-  PENICILLIN: SIGNIFICANT CHANGE UP
-  RIFAMPIN: SIGNIFICANT CHANGE UP
-  TETRACYCLINE: SIGNIFICANT CHANGE UP
-  TRIMETHOPRIM/SULFAMETHOXAZOLE: SIGNIFICANT CHANGE UP
-  VANCOMYCIN: SIGNIFICANT CHANGE UP
CULTURE RESULTS: SIGNIFICANT CHANGE UP
CULTURE RESULTS: SIGNIFICANT CHANGE UP
METHOD TYPE: SIGNIFICANT CHANGE UP
ORGANISM # SPEC MICROSCOPIC CNT: SIGNIFICANT CHANGE UP
SPECIMEN SOURCE: SIGNIFICANT CHANGE UP
SPECIMEN SOURCE: SIGNIFICANT CHANGE UP

## 2020-04-11 DIAGNOSIS — I10 ESSENTIAL (PRIMARY) HYPERTENSION: ICD-10-CM

## 2020-04-11 DIAGNOSIS — E11.9 TYPE 2 DIABETES MELLITUS WITHOUT COMPLICATIONS: ICD-10-CM

## 2020-04-11 DIAGNOSIS — B97.29 OTHER CORONAVIRUS AS THE CAUSE OF DISEASES CLASSIFIED ELSEWHERE: ICD-10-CM

## 2020-04-11 DIAGNOSIS — R09.02 HYPOXEMIA: ICD-10-CM

## 2020-04-11 DIAGNOSIS — J12.9 VIRAL PNEUMONIA, UNSPECIFIED: ICD-10-CM

## 2020-04-11 DIAGNOSIS — Z79.84 LONG TERM (CURRENT) USE OF ORAL HYPOGLYCEMIC DRUGS: ICD-10-CM

## 2020-04-11 DIAGNOSIS — J12.89 OTHER VIRAL PNEUMONIA: ICD-10-CM

## 2020-04-11 DIAGNOSIS — F17.200 NICOTINE DEPENDENCE, UNSPECIFIED, UNCOMPLICATED: ICD-10-CM

## 2020-04-11 DIAGNOSIS — E78.00 PURE HYPERCHOLESTEROLEMIA, UNSPECIFIED: ICD-10-CM

## 2020-04-11 DIAGNOSIS — J44.9 CHRONIC OBSTRUCTIVE PULMONARY DISEASE, UNSPECIFIED: ICD-10-CM

## 2020-07-01 PROBLEM — E11.9 TYPE 2 DIABETES MELLITUS WITHOUT COMPLICATIONS: Chronic | Status: ACTIVE | Noted: 2020-03-28

## 2020-07-01 PROBLEM — I10 ESSENTIAL (PRIMARY) HYPERTENSION: Chronic | Status: ACTIVE | Noted: 2020-03-28

## 2020-07-01 PROBLEM — E78.00 PURE HYPERCHOLESTEROLEMIA, UNSPECIFIED: Chronic | Status: ACTIVE | Noted: 2020-03-28

## 2020-07-07 ENCOUNTER — OUTPATIENT (OUTPATIENT)
Dept: OUTPATIENT SERVICES | Facility: HOSPITAL | Age: 75
LOS: 1 days | Discharge: HOME | End: 2020-07-07
Payer: MEDICARE

## 2020-07-07 VITALS — WEIGHT: 145 LBS | BODY MASS INDEX: 27.38 KG/M2 | HEIGHT: 61 IN

## 2020-07-07 DIAGNOSIS — F17.210 NICOTINE DEPENDENCE, CIGARETTES, UNCOMPLICATED: ICD-10-CM

## 2020-07-07 DIAGNOSIS — Z87.891 PERSONAL HISTORY OF NICOTINE DEPENDENCE: ICD-10-CM

## 2020-07-07 DIAGNOSIS — Z78.9 OTHER SPECIFIED HEALTH STATUS: ICD-10-CM

## 2020-07-07 DIAGNOSIS — Z80.6 FAMILY HISTORY OF LEUKEMIA: ICD-10-CM

## 2020-07-07 DIAGNOSIS — Z86.39 PERSONAL HISTORY OF OTHER ENDOCRINE, NUTRITIONAL AND METABOLIC DISEASE: ICD-10-CM

## 2020-07-07 DIAGNOSIS — Z63.4 DISAPPEARANCE AND DEATH OF FAMILY MEMBER: ICD-10-CM

## 2020-07-07 DIAGNOSIS — Z60.2 PROBLEMS RELATED TO LIVING ALONE: ICD-10-CM

## 2020-07-07 DIAGNOSIS — Z80.0 FAMILY HISTORY OF MALIGNANT NEOPLASM OF DIGESTIVE ORGANS: ICD-10-CM

## 2020-07-07 DIAGNOSIS — Z12.2 ENCOUNTER FOR SCREENING FOR MALIGNANT NEOPLASM OF RESPIRATORY ORGANS: ICD-10-CM

## 2020-07-07 DIAGNOSIS — Z86.79 PERSONAL HISTORY OF OTHER DISEASES OF THE CIRCULATORY SYSTEM: ICD-10-CM

## 2020-07-07 DIAGNOSIS — Z87.09 PERSONAL HISTORY OF OTHER DISEASES OF THE RESPIRATORY SYSTEM: ICD-10-CM

## 2020-07-07 PROBLEM — Z00.00 ENCOUNTER FOR PREVENTIVE HEALTH EXAMINATION: Status: ACTIVE | Noted: 2020-07-07

## 2020-07-07 PROCEDURE — G0297: CPT | Mod: 26

## 2020-07-07 RX ORDER — METOPROLOL TARTRATE 50 MG/1
50 TABLET, FILM COATED ORAL
Refills: 0 | Status: ACTIVE | COMMUNITY

## 2020-07-07 RX ORDER — AMLODIPINE BESYLATE 5 MG/1
5 TABLET ORAL
Refills: 0 | Status: ACTIVE | COMMUNITY

## 2020-07-07 RX ORDER — METFORMIN HYDROCHLORIDE 500 MG/1
500 TABLET, COATED ORAL
Refills: 0 | Status: ACTIVE | COMMUNITY

## 2020-07-07 RX ORDER — PRAVASTATIN SODIUM 20 MG/1
20 TABLET ORAL
Refills: 0 | Status: ACTIVE | COMMUNITY

## 2020-07-07 RX ORDER — CALCIUM CARBONATE 600 MG
TABLET ORAL
Refills: 0 | Status: ACTIVE | COMMUNITY

## 2020-07-07 RX ORDER — UBIQUINOL 100 MG
CAPSULE ORAL
Refills: 0 | Status: ACTIVE | COMMUNITY

## 2020-07-07 RX ORDER — PIOGLITAZONE HYDROCHLORIDE 15 MG/1
15 TABLET ORAL
Refills: 0 | Status: ACTIVE | COMMUNITY

## 2020-07-07 RX ORDER — GUAIFENESIN 600 MG/1
600 TABLET, EXTENDED RELEASE ORAL
Refills: 0 | Status: ACTIVE | COMMUNITY

## 2020-07-07 RX ORDER — ASPIRIN 81 MG
81 TABLET, DELAYED RELEASE (ENTERIC COATED) ORAL
Refills: 0 | Status: ACTIVE | COMMUNITY

## 2020-07-07 SDOH — SOCIAL STABILITY - SOCIAL INSECURITY: PROBLEMS RELATED TO LIVING ALONE: Z60.2

## 2020-07-07 SDOH — SOCIAL STABILITY - SOCIAL INSECURITY: DISSAPEARANCE AND DEATH OF FAMILY MEMBER: Z63.4

## 2020-07-07 NOTE — REASON FOR VISIT
[Initial Evaluation] : an initial evaluation visit [Review of Eligibility] : review of eligibility [Face-to-Face Visit] : face-to-face visit [Low-Dose CT Screening Discussion] : low-dose CT lung cancer screening discussion

## 2020-07-13 NOTE — PLAN
[FreeTextEntry1] : Ms. NIXON VILLAGOMEZ was seen today and was engaged in shared decision making. Shared decision making, including the use of one or more decision aids, to include benefits and harms of screening, follow up diagnostic testing, over-diagnosis, false positive rate, and total radiation exposure was done. Counseling regarding the importance of adherence to annual lung cancer LDCT screening, impact of comorbidities and ability or willingness to undergo diagnosis and treatment was done.\par \par Answered all questions, and she was instructed to call our office with any further questions or concerns. She verbalized understanding and is in agreement with the plan.\par \par \par Plan:\par \par -Low Dose CT chest for lung cancer screening\par \par -Follow up with patient and her referring provider after her LDCT results have been reviewed by the multi-disciplinary clinical team\par \par -Encouraged continued smoking abstinence\par \par \par

## 2020-07-13 NOTE — RESULTS/DATA
[FreeTextEntry1] : Should I Screen? tool utilized. 6 year risk of lung cancer is 12.9%. Patient wishes to proceed with screening.

## 2020-07-13 NOTE — HISTORY OF PRESENT ILLNESS
[TextBox_13] : Referred by Dr. Arvind Leon\par \par Ms. NIXON VILLAGOMEZ is a 75 year year old female  with a history of HTN, high cholesterol, DM, COPD/emphysema, knee surgery, and s/p lung surgery in  to remove a nodule was seen today for review of eligibility for, as well as, discussion of Low-Dose CT lung cancer screening program. Reviewed and confirmed that the patient meets screening eligibility criteria:\par \par -Age:  75 year \par Smoking status: Former smoker\par \par -Number of pack(s) per day: 1\par  \par -Number of year smoked: 50\par \par -Number of pack years smokin\par \par -Number of years since quitting smokin (quit 2 months ago)\par \par -Quit year: \par \par Ms. VILLAGOMEZ  denies any signs or symptoms of lung cancer including new cough, change in cough, hemoptysis and unintentional weight loss.  Able to walk "a couple of blocks" and goes to [place] for relaxation.  \par \par Ms. VILLAGOMEZ  denies any personal history of lung cancer. No lung cancer in a 1st degree relative. Denies any history of lung disease. Denies any history of occupational exposures.\par \par Her healthcare team is as follows:\par PMD: Cate Collins\par Cardio: none\par Pulm: Arvind Leon\par

## 2020-08-20 ENCOUNTER — TRANSCRIPTION ENCOUNTER (OUTPATIENT)
Age: 75
End: 2020-08-20

## 2020-12-04 ENCOUNTER — OUTPATIENT (OUTPATIENT)
Dept: OUTPATIENT SERVICES | Facility: HOSPITAL | Age: 75
LOS: 1 days | Discharge: HOME | End: 2020-12-04
Payer: MEDICARE

## 2020-12-04 DIAGNOSIS — Z12.31 ENCOUNTER FOR SCREENING MAMMOGRAM FOR MALIGNANT NEOPLASM OF BREAST: ICD-10-CM

## 2020-12-04 PROCEDURE — 77063 BREAST TOMOSYNTHESIS BI: CPT | Mod: 26

## 2020-12-04 PROCEDURE — 77067 SCR MAMMO BI INCL CAD: CPT | Mod: 26

## 2020-12-29 NOTE — ED PROVIDER NOTE - NS ED MD DISPO ISOLATION TYPES
losartan (COZAAR) 100 MG tablet      Last Written Prescription Date:  12/13/2019  Last Fill Quantity: 90,   # refills: 3  Last Office Visit : 11/18/2020  Future Office visit:  none    Routing refill request to provider for review/approval because:  Failed medication protocol: abnormal lab.  - Creatinine (H)    Creatinine   Date Value Ref Range Status   10/12/2020 1.22 (H) 0.52 - 1.04 mg/dL Final          Airborne/Contact

## 2021-05-19 NOTE — PROGRESS NOTE ADULT - ASSESSMENT
75 yo F presented with fever, sob, cough.    1. COVID PNA  - Currently sat 96-98 on 2L NC. Will try to titrate to RA tomorrow morning and check on ambulation  - f/u CRP procalcitonin  - ID f/u for HCQ protocol    2. DM: FS controlled    3. HTN/HLD  - Cont amlodipine 5mg daily  - Cont atorvastatin 10mg qHs    4. COPD  - Likely not in acute exacerbation  - Cont symbicort    GI/DVT PPX    #Progress Note Handoff  Pending (specify): Clinical improvement  Family discussion: d/w pt regarding tapering off oxygen  Disposition: Home Negative

## 2021-12-07 ENCOUNTER — OUTPATIENT (OUTPATIENT)
Dept: OUTPATIENT SERVICES | Facility: HOSPITAL | Age: 76
LOS: 1 days | Discharge: HOME | End: 2021-12-07
Payer: MEDICARE

## 2021-12-07 DIAGNOSIS — Z12.31 ENCOUNTER FOR SCREENING MAMMOGRAM FOR MALIGNANT NEOPLASM OF BREAST: ICD-10-CM

## 2021-12-07 PROCEDURE — 77063 BREAST TOMOSYNTHESIS BI: CPT | Mod: 26

## 2021-12-07 PROCEDURE — 77067 SCR MAMMO BI INCL CAD: CPT | Mod: 26

## 2022-01-20 NOTE — PATIENT PROFILE ADULT - NSPROGENOTHERPROVIDER_GEN_A_NUR
For information on Fall & Injury Prevention, visit: [email or web address] OR  [email or web address] OR  https://www.cdc.gov/steadi/patient.html none

## 2022-08-22 ENCOUNTER — APPOINTMENT (OUTPATIENT)
Dept: VASCULAR SURGERY | Facility: CLINIC | Age: 77
End: 2022-08-22

## 2022-08-22 VITALS
DIASTOLIC BLOOD PRESSURE: 77 MMHG | HEIGHT: 61 IN | SYSTOLIC BLOOD PRESSURE: 155 MMHG | BODY MASS INDEX: 28.7 KG/M2 | HEART RATE: 68 BPM | WEIGHT: 152 LBS

## 2022-08-22 DIAGNOSIS — M79.89 OTHER SPECIFIED SOFT TISSUE DISORDERS: ICD-10-CM

## 2022-08-22 DIAGNOSIS — I83.893 VARICOSE VEINS OF BILATERAL LOWER EXTREMITIES WITH OTHER COMPLICATIONS: ICD-10-CM

## 2022-08-22 DIAGNOSIS — Z86.79 PERSONAL HISTORY OF OTHER DISEASES OF THE CIRCULATORY SYSTEM: ICD-10-CM

## 2022-08-22 PROCEDURE — 93970 EXTREMITY STUDY: CPT

## 2022-08-22 PROCEDURE — 99204 OFFICE O/P NEW MOD 45 MIN: CPT

## 2022-08-22 NOTE — HISTORY OF PRESENT ILLNESS
[FreeTextEntry1] : The patient is a 77-year-old female who presents with leg swelling telangiectasias and mild varicosities. She states her legs up and chronically swollen for the past 2 years. She denies any history of DVT

## 2022-08-22 NOTE — ASSESSMENT
[FreeTextEntry1] : the patient is a 77-year-old female with bilateral lower extremity chronic edema. She has left leg varicose veins which are asymptomatic. I performed a venous duplex this shows no evidence of DVT however there was an incompetent left greater saphenous vein I recommend conservative management with compression stocking therapy a 23 mm mercury compression to be one daily. No vascular surgery intervention at this time

## 2022-08-22 NOTE — DATA REVIEWED
[FreeTextEntry1] : venous duplex shows no evidence of DVT or greater saphenous vein right no evidence of reflux\par \par Left there is no evidence of acute deep venous thrombosis or superficial thrombophlebitis. All major veins are patent and compressible. There is no evidence of reflux and the main trunk of the great saphenous and small saphenous vein. There are multiple varicosities seen in the anterior proximal thigh extending into the mid thigh measuring 6.7 x 3.5 mm. Negative deep system reflux

## 2023-04-18 ENCOUNTER — OUTPATIENT (OUTPATIENT)
Dept: OUTPATIENT SERVICES | Facility: HOSPITAL | Age: 78
LOS: 1 days | End: 2023-04-18
Payer: MEDICARE

## 2023-04-18 ENCOUNTER — RESULT REVIEW (OUTPATIENT)
Age: 78
End: 2023-04-18

## 2023-04-18 DIAGNOSIS — Z12.31 ENCOUNTER FOR SCREENING MAMMOGRAM FOR MALIGNANT NEOPLASM OF BREAST: ICD-10-CM

## 2023-04-18 PROCEDURE — 77063 BREAST TOMOSYNTHESIS BI: CPT | Mod: 26

## 2023-04-18 PROCEDURE — 77067 SCR MAMMO BI INCL CAD: CPT | Mod: 26

## 2023-04-18 PROCEDURE — 77063 BREAST TOMOSYNTHESIS BI: CPT

## 2023-04-18 PROCEDURE — 77067 SCR MAMMO BI INCL CAD: CPT

## 2023-04-19 DIAGNOSIS — Z12.31 ENCOUNTER FOR SCREENING MAMMOGRAM FOR MALIGNANT NEOPLASM OF BREAST: ICD-10-CM

## 2023-09-19 ENCOUNTER — APPOINTMENT (OUTPATIENT)
Dept: OTOLARYNGOLOGY | Facility: CLINIC | Age: 78
End: 2023-09-19

## 2023-10-16 NOTE — PATIENT PROFILE ADULT - BRADEN NUTRITION
[facility] at [facility] Surgery History and Physical    History of Present Illness: Viry Landis is a 47 y.o. female who has been approved for laparoscopic Suzanne-en-Y gastric bypass. She has been in good health. She is getting an IVC filter placed in a few days. She will be off her blood thinners. Past Medical History:   Diagnosis Date    Hx of blood clots 2021    SECOND BLOOD CLOT- ON ELIQUIS    Hx traumatic fracture 01/01/2000    left ankle    Hypertension     Obesity     Sleep apnea     cpap every night    Thromboembolus ([address]) 2020    Right Leg       Past Surgical History:   Procedure Laterality Date    COLONOSCOPY N/A 10/17/2019    COLONOSCOPY performed by Jean José MD at [facility]  2021    ENDOSCOPY, COLON, DIAGNOSTIC      WISDOM TOOTH EXTRACTION           Current Outpatient Medications:     Prenatal Vit-DSS-Fe Cbn-FA (PRENATAL ADVANTAGE PO), Take 1 tablet by mouth daily, Disp: , Rfl:     ASPIRIN 81 PO, Take 1 tablet by mouth daily, Disp: , Rfl:     apixaban (ELIQUIS) 5 MG TABS tablet, Take 1 tablet by mouth 2 times daily, Disp: 30 tablet, Rfl: 0    losartan-hydroCHLOROthiazide (HYZAAR) 50-12.5 MG per tablet, Take 1 tablet by mouth daily, Disp: 30 tablet, Rfl: 5    Cholecalciferol 50 MCG (2000 UT) CAPS, Take 2 capsules by mouth daily, Disp: , Rfl:     polyethylene glycol (GLYCOLAX) 17 GM/SCOOP powder, Take 17 g by mouth daily (Patient not taking: Reported on 10/16/2023), Disp: 1530 g, Rfl: 0    ondansetron (ZOFRAN) 4 MG tablet, Take 1 tablet by mouth every 8 hours as needed for Nausea or Vomiting (Patient not taking: Reported on 10/16/2023), Disp: 30 tablet, Rfl: 0    gabapentin (NEURONTIN) 100 MG capsule, Take 1-2 capsules by mouth 3 times daily for 5 days.  Max Daily Amount: 600 mg (Patient not taking: Reported on 10/16/2023), Disp: 30 capsule, Rfl: 0    omeprazole (PRILOSEC) 40 MG delayed release capsule, Take 1 capsule by mouth every (3) adequate

## 2024-04-29 ENCOUNTER — OUTPATIENT (OUTPATIENT)
Dept: OUTPATIENT SERVICES | Facility: HOSPITAL | Age: 79
LOS: 1 days | End: 2024-04-29
Payer: MEDICARE

## 2024-04-29 DIAGNOSIS — Z12.31 ENCOUNTER FOR SCREENING MAMMOGRAM FOR MALIGNANT NEOPLASM OF BREAST: ICD-10-CM

## 2024-04-29 PROCEDURE — 77063 BREAST TOMOSYNTHESIS BI: CPT | Mod: 26

## 2024-04-29 PROCEDURE — 77067 SCR MAMMO BI INCL CAD: CPT | Mod: 26

## 2024-04-29 PROCEDURE — 77067 SCR MAMMO BI INCL CAD: CPT

## 2024-04-29 PROCEDURE — 77063 BREAST TOMOSYNTHESIS BI: CPT

## 2024-04-30 DIAGNOSIS — Z12.31 ENCOUNTER FOR SCREENING MAMMOGRAM FOR MALIGNANT NEOPLASM OF BREAST: ICD-10-CM

## 2024-06-27 NOTE — ED PROVIDER NOTE - NSTIMEPROVIDERCAREINITIATE_GEN_ER
"Subjective   Patient ID: Gracy Swanson is a 81 y.o. female who presents for Follow-up (on various conditions).    Ms. Gracy Swanson today came here for follow-up on various conditions.  Overall, she is a happy person.  Appetite and weight are okay.  No chest pain or shortness of breath.  Taking medications regularly.  No side effects.  She got the ______.  She is a person of good habits.    I have personally reviewed the patient's Past Medical History, Medications, Allergies, Social History, and Family History in the EMR.    Review of Systems   All other systems reviewed and are negative.    Objective   /70   Ht 1.702 m (5' 7\")   Wt 88.5 kg (195 lb)   BMI 30.54 kg/m²     Physical Exam  Vitals reviewed.   Cardiovascular:      Heart sounds: Normal heart sounds, S1 normal and S2 normal. No murmur heard.     No friction rub.   Pulmonary:      Effort: Pulmonary effort is normal.      Breath sounds: Normal breath sounds and air entry.   Abdominal:      Palpations: There is no hepatomegaly, splenomegaly or mass.   Musculoskeletal:      Right lower leg: No edema.      Left lower leg: No edema.   Lymphadenopathy:      Lower Body: No right inguinal adenopathy. No left inguinal adenopathy.   Neurological:      Cranial Nerves: Cranial nerves 2-12 are intact.      Sensory: No sensory deficit.      Motor: Motor function is intact.      Deep Tendon Reflexes: Reflexes are normal and symmetric.     LAB WORK: Laboratory testing discussed.    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 Hypertension, okay.  2. High cholesterol, stable.  3. Hypothyroid, check thyroid.  4. Postmenopausal.  Take calcium.  5. Pre-diabetic.  Keep an eye.  6. Follow-up blood work in three months.  Happy to see her anytime sooner if necessary.  7. Refill given.  8. Continue rest of the care.    Scribe Attestation  By signing my name below, IAlina Scribe attest that this documentation has been prepared under the direction and in the presence of Milind Parada MD.    28-Mar-2020 19:54

## 2025-01-31 NOTE — PATIENT PROFILE ADULT - NSTOBACCOCESSATIONEDU5_GEN_A_NUR
Per spouse she  is aware there are refills on file but wants to make sure there is enough to last until June which is patient's appointment to establish care with a provider closer to home.  Patient spouse states patient lives in [place] and does not want to come in \"I was assured once Dr Burgos left he would not have to come in for refills and could get them until we found a new provider\"    Per spouse patient declines coming in and only prefers refills.  She knows there are refills on file but not enough to last until June.  Please advise    Withdrawal symptoms include negative mood, urges to smoke, and difficulty concentrating

## 2025-09-18 ENCOUNTER — APPOINTMENT (OUTPATIENT)
Dept: OTOLARYNGOLOGY | Facility: CLINIC | Age: 80
End: 2025-09-18

## 2025-09-18 VITALS — HEIGHT: 61 IN | WEIGHT: 155 LBS | BODY MASS INDEX: 29.27 KG/M2

## 2025-09-18 DIAGNOSIS — R04.0 EPISTAXIS: ICD-10-CM

## 2025-09-18 DIAGNOSIS — H69.91 UNSPECIFIED EUSTACHIAN TUBE DISORDER, RIGHT EAR: ICD-10-CM

## 2025-09-18 DIAGNOSIS — H90.3 SENSORINEURAL HEARING LOSS, BILATERAL: ICD-10-CM

## 2025-09-18 DIAGNOSIS — R09.82 POSTNASAL DRIP: ICD-10-CM

## 2025-09-18 RX ORDER — FLUTICASONE FUROATE 27.5 UG/1
27.5 SPRAY, METERED NASAL TWICE DAILY
Qty: 1 | Refills: 2 | Status: ACTIVE | COMMUNITY
Start: 2025-09-18 | End: 1900-01-01